# Patient Record
Sex: MALE | Race: BLACK OR AFRICAN AMERICAN
[De-identification: names, ages, dates, MRNs, and addresses within clinical notes are randomized per-mention and may not be internally consistent; named-entity substitution may affect disease eponyms.]

---

## 2019-03-22 ENCOUNTER — HOSPITAL ENCOUNTER (EMERGENCY)
Dept: HOSPITAL 97 - ER | Age: 39
Discharge: LEFT BEFORE DISCHARGE | End: 2019-03-22
Payer: SELF-PAY

## 2019-03-22 DIAGNOSIS — Z53.21: Primary | ICD-10-CM

## 2019-03-22 NOTE — ER
Nurse's Notes                                                                                     

 [facility]                                                                

Name: Isabel Gaitan                                                                               

Age: 38 yrs                                                                                       

Sex: Male                                                                                         

: 1980                                                                                   

MRN: [id number]                                                                                   

Arrival Date: 2019                                                                          

Time: 19:45                                                                                       

Account#: [id number]                                                                            

Bed Waiting                                                                                       

Private MD:                                                                                       

Diagnosis:                                                                                        

                                                                                                  

ED Course:                                                                                        

                                                                                             

19:45 Patient arrived in ED.                                                                  es  

20:08 Patient's name was called from ER lobby. No response.                                     

20:49 Patient's name was called from ER lobby. No response. Unable to locate patient. Will    bb  

      disposition as left without being seen by a provider.                                       

                                                                                                  

Administered Medications:                                                                         

No medications were administered                                                                  

                                                                                                  

                                                                                                  

Outcome:                                                                                          

20:50 Patient left the ED.                                                                    bb  

                                                                                                  

Signatures:                                                                                       

Christine Guevara, RN                  RN   Meche Greenwood Brenda RN                     RN   bb                                                   

                                                                                                  

**************************************************************************************************

## 2020-08-17 ENCOUNTER — HOSPITAL ENCOUNTER (EMERGENCY)
Dept: HOSPITAL 97 - ER | Age: 40
Discharge: HOME | End: 2020-08-17
Payer: SELF-PAY

## 2020-08-17 VITALS — SYSTOLIC BLOOD PRESSURE: 134 MMHG | TEMPERATURE: 98.1 F | DIASTOLIC BLOOD PRESSURE: 96 MMHG | OXYGEN SATURATION: 98 %

## 2020-08-17 DIAGNOSIS — F41.9: ICD-10-CM

## 2020-08-17 DIAGNOSIS — R07.9: Primary | ICD-10-CM

## 2020-08-17 PROCEDURE — 99284 EMERGENCY DEPT VISIT MOD MDM: CPT

## 2020-08-17 PROCEDURE — 93005 ELECTROCARDIOGRAM TRACING: CPT

## 2020-08-17 NOTE — EDPHYS
Physician Documentation                                                                           

 [facility]                                                                 

Name: Isabel Gaitan                                                                               

Age: 39 yrs                                                                                       

Sex: Male                                                                                         

: 1980                                                                                   

MRN: [id number]                                                                                   

Arrival Date: 2020                                                                          

Time: 16:16                                                                                       

Account#: [id number]                                                                            

Bed 15                                                                                            

Private MD:                                                                                       

ED Physician Juan Pablo Leggett                                                                         

HPI:                                                                                              

                                                                                             

16:24 This 39 yrs old Black Male presents to ER via Unassigned with complaints of Chest Pain  jr8 

      > 32 y/o.                                                                                   

16:24 The patient or guardian reports chest pain that is located primarily in the substernal  jr8 

      area. The pain does not radiate. Associated signs and symptoms: The patient has no          

      apparent associated signs or symptoms. The chest pain is described as sharp. Duration:      

      The patient or guardian reports a single episode, that is now resolved. Modifying           

      factors: The symptoms are alleviated by rest, the symptoms are aggravated by                

      emotionally stressful situations. Severity of pain: At its worst the pain was mild in       

      the emergency department the pain has resolved. The patient has not experienced similar     

      symptoms in the past. The patient has not recently seen a physician. Patient brought in     

      by PD for medical clearance after he started to complain of chest pain. Patient stated      

      that he is now better and feels that it was anxiety related. No symptoms at this time       

      and wants to be discharged .                                                                

                                                                                                  

Historical:                                                                                       

- Allergies:                                                                                      

16:28 No Known Allergies;                                                                     tw2 

- Home Meds:                                                                                      

16:28 None [Active];                                                                          tw2 

- PMHx:                                                                                           

16:28 Anxiety; chest pain; clausterphobic; Depression; Hernia;                                tw2 

                                                                                                  

- Immunization history:: Adult Immunizations.                                                     

- Social history:: Smoking status: .                                                              

                                                                                                  

                                                                                                  

ROS:                                                                                              

16:24 Eyes: Negative for injury, pain, redness, and discharge, ENT: Negative for injury,      jr8 

      pain, and discharge, Neck: Negative for injury, pain, and swelling, Respiratory:            

      Negative for shortness of breath, cough, wheezing, and pleuritic chest pain,                

      Abdomen/GI: Negative for abdominal pain, nausea, vomiting, diarrhea, and constipation,      

      Back: Negative for injury and pain, MS/Extremity: Negative for injury and deformity,        

      Skin: Negative for injury, rash, and discoloration, Neuro: Negative for headache,           

      weakness, numbness, tingling, and seizure.                                                  

16:24 Cardiovascular: Positive for chest pain, Negative for orthopnea, palpitations,              

      paroxysmal nocturnal dyspnea.                                                               

                                                                                                  

Exam:                                                                                             

16:24 Eyes:  Pupils equal round and reactive to light, extra-ocular motions intact.  Lids and jr8 

      lashes normal.  Conjunctiva and sclera are non-icteric and not injected.  Cornea within     

      normal limits.  Periorbital areas with no swelling, redness, or edema. ENT:  Nares          

      patent. No nasal discharge, no septal abnormalities noted.  Tympanic membranes are          

      normal and external auditory canals are clear.  Oropharynx with no redness, swelling,       

      or masses, exudates, or evidence of obstruction, uvula midline.  Mucous membranes           

      moist. Neck:  Trachea midline, no thyromegaly or masses palpated, and no cervical           

      lymphadenopathy.  Supple, full range of motion without nuchal rigidity, or vertebral        

      point tenderness.  No Meningismus. Cardiovascular:  Regular rate and rhythm with a          

      normal S1 and S2.  No gallops, murmurs, or rubs.  Normal PMI, no JVD.  No pulse             

      deficits. Respiratory:  Lungs have equal breath sounds bilaterally, clear to                

      auscultation and percussion.  No rales, rhonchi or wheezes noted.  No increased work of     

      breathing, no retractions or nasal flaring. Abdomen/GI:  Soft, non-tender, with normal      

      bowel sounds.  No distension or tympany.  No guarding or rebound.  No evidence of           

      tenderness throughout. Back:  No spinal tenderness.  No costovertebral tenderness.          

      Full range of motion. Skin:  Warm, dry with normal turgor.  Normal color with no            

      rashes, no lesions, and no evidence of cellulitis. MS/ Extremity:  Pulses equal, no         

      cyanosis.  Neurovascular intact.  Full, normal range of motion. Neuro:  Awake and           

      alert, GCS 15, oriented to person, place, time, and situation.  Cranial nerves II-XII       

      grossly intact.  Motor strength 5/5 in all extremities.  Sensory grossly intact.            

      Cerebellar exam normal.  Normal gait.                                                       

                                                                                                  

Vital Signs:                                                                                      

16:24  / 96; Pulse 71; Resp 17; Temp 98.1; Pulse Ox 98% on R/A; Pain 0/10;              tw2 

                                                                                                  

MDM:                                                                                              

16:22 Patient medically screened.                                                             jr8 

16:26 SHAY Risk Score: TOTAL SCORE = 1. Data reviewed: vital signs, nurses notes, EKG, and as jr8 

      a result, I will discharge patient. Data interpreted: Pulse oximetry: on room air is        

      100 %. Interpretation: normal. Counseling: I had a detailed discussion with the patient     

      and/or guardian regarding: the historical points, exam findings, and any diagnostic         

      results supporting the discharge/admit diagnosis, the need for outpatient follow up, a      

      family practitioner, to return to the emergency department if symptoms worsen or            

      persist or if there are any questions or concerns that arise at home.                       

                                                                                                  

                                                                                             

16:26 Order name: EKG; Complete Time: 16:26                                                   jr8 

                                                                                             

16:26 Order name: EKG - Nurse/Tech; Complete Time: 16:38                                      jr8 

                                                                                                  

Administered Medications:                                                                         

No medications were administered                                                                  

                                                                                                  

                                                                                                  

Disposition:                                                                                      

17:32 Co-signature as Attending Physician, Juan Pablo Leggett MD.                                    rn  

                                                                                                  

Disposition:                                                                                      

20 16:26 Discharged to Home. Impression: Chest pain, unspecified.                           

- Condition is Stable.                                                                            

- Discharge Instructions: Nonspecific Chest Pain.                                                 

                                                                                                  

- Medication Reconciliation Form, Thank You Letter, Antibiotic Education, Prescription            

  Opioid Use form.                                                                                

- Follow up: Private Physician; When: As needed; Reason: Recheck today's complaints,              

  Continuance of care, Re-evaluation by your physician.                                           

- Problem is new.                                                                                 

- Symptoms are resolved.                                                                          

                                                                                                  

                                                                                                  

                                                                                                  

Signatures:                                                                                       

Juan Pablo Leggett MD MD rn Roszak, Josh, PA PA   jr8                                                  

Shanell Stinson RN                          RN   tw2                                                  

                                                                                                  

Corrections: (The following items were deleted from the chart)                                    

16:39 16:26 2020 16:26 Discharged to Home. Impression: Chest pain, unspecified.         tw2 

      Condition is Stable. Forms are Medication Reconciliation Form, Thank You Letter,            

      Antibiotic Education, Prescription Opioid Use. Follow up: Private Physician; When: As       

      needed; Reason: Recheck today's complaints, Continuance of care, Re-evaluation by your      

      physician. Problem is new. Symptoms are resolved. jr8                                       

                                                                                                  

**************************************************************************************************

## 2020-08-17 NOTE — ER
Nurse's Notes                                                                                     

 [facility]                                                                 

Name: Isabel Gaitan                                                                               

Age: 39 yrs                                                                                       

Sex: Male                                                                                         

: 1980                                                                                   

MRN: [id number]                                                                                   

Arrival Date: 2020                                                                          

Time: 16:16                                                                                       

Account#: [id number]                                                                            

Bed 15                                                                                            

Private MD:                                                                                       

Diagnosis: Chest pain, unspecified                                                                

                                                                                                  

Presentation:                                                                                     

                                                                                             

16:24 Chief complaint: [place] [employer], he was going to [place] [employer] started having anxiety c/o  tw2 

      chest pain, states he is better now and is going to [facility]. Coronavirus screen: Client      

      denies travel out of the [place] in the last 14 days. At this time, the client does not        

      indicate any symptoms associated with coronavirus-19. Ebola Screen: Patient denies          

      travel to an Ebola-affected area in the 21 days before illness onset. Initial Sepsis        

      Screen: Does the patient meet any 2 criteria? No. Patient's initial sepsis screen is        

      negative. Does the patient have a suspected source of infection? No. Patient's initial      

      sepsis screen is negative. Risk Assessment: Do you want to hurt yourself or someone         

      else? Patient reports no desire to harm self or others. Onset of symptoms was 2020.                                                                                   

16:24 Method Of Arrival: Law Enforcement: [facility]                                         tw2 

16:24 Acuity: LATONYA 3                                                                           tw2 

16:29 Note provider JAMES Manjarrez PA at bedside at this time.                                      tw2 

                                                                                                  

Triage Assessment:                                                                                

16:26 General: Appears in no apparent distress. Behavior is calm, cooperative, appropriate    tw2 

      for age. Pain: Denies pain. EENT: No signs and/or symptoms were reported regarding the      

      EENT system. Neuro: Level of Consciousness is awake, alert, obeys commands, Oriented to     

      person, place, time, situation. Cardiovascular: Denies chest pain, shortness of breath,     

      Heart tones S1 S2 Patient's skin is warm and dry. Respiratory: Airway is patent             

      Respiratory effort is even, unlabored, Respiratory pattern is regular, symmetrical,         

      Breath sounds are clear bilaterally. GI: No signs and/or symptoms were reported             

      involving the gastrointestinal system. Abdomen is flat, Bowel sounds present X 4 quads.     

      : No signs and/or symptoms were reported regarding the genitourinary system. Derm: No     

      signs and/or symptoms reported regarding the dermatologic system. Musculoskeletal:          

      Range of motion: intact in all extremities.                                                 

                                                                                                  

Historical:                                                                                       

- Allergies:                                                                                      

16:28 No Known Allergies;                                                                     tw2 

- Home Meds:                                                                                      

16:28 None [Active];                                                                          tw2 

- PMHx:                                                                                           

16:28 Anxiety; chest pain; clausterphobic; Depression; Hernia;                                tw2 

                                                                                                  

- Immunization history:: Adult Immunizations.                                                     

- Social history:: Smoking status: .                                                              

                                                                                                  

                                                                                                  

Screenin: Abuse screen: Denies threats or abuse. Nutritional screening: No deficits noted.        tw2 

      Tuberculosis screening: No symptoms or risk factors identified. Fall Risk None              

      identified.                                                                                 

                                                                                                  

Assessment:                                                                                       

16:16 Reassessment: pt states "i feel no pain now i am just ready to go".                     tw2 

16:24 Pain: Pain does not radiate. Pain began suddenly.                                       tw2 

16:26 Reassessment: see triage assessment.                                                    tw2 

16: Reassessment:.                                                                          tw2 

16:39 Reassessment: Patient appears in no apparent distress at this time. No changes from     tw2 

      previously documented assessment. Patient and/or family updated on plan of care and         

      expected duration. Pain level reassessed. Patient is alert, oriented x 3, equal             

      unlabored respirations, skin warm/dry/pink.                                                 

                                                                                                  

Vital Signs:                                                                                      

16:24  / 96; Pulse 71; Resp 17; Temp 98.1; Pulse Ox 98% on R/A; Pain 0/10;              tw2 

                                                                                                  

ED Course:                                                                                        

16:16 Patient arrived in ED.                                                                  tw2 

16:22 Loki Norman PA is PHCP.                                                               [id number] 

16:22 Juan Pablo Leggett MD is Attending Physician.                                                [id number] 

16:24 Shanell Stinson RN is Primary Nurse.                                                        tw2 

16:24 Arm band placed on.                                                                     tw2 

16:26 Triage completed.                                                                       tw2 

16:28 Bed in low position. Call light in reach. Cardiac monitor on. Pulse ox on. NIBP on.     tw2 

16:39 No provider procedures requiring assistance completed. Patient did not have IV access   tw2 

      during this emergency room visit. Patient maintains SpO2 saturation greater than 95% on     

      room air.                                                                                   

                                                                                                  

Administered Medications:                                                                         

No medications were administered                                                                  

                                                                                                  

                                                                                                  

Outcome:                                                                                          

16: Discharge ordered by MD.                                                                [id number] 

16:39 Discharged to Law Enforcement                                                           tw2 

16:39 Condition: stable                                                                           

16:39 Discharge instructions given to patient, police, Instructed on discharge instructions,      

      follow up and referral plans. Demonstrated understanding of instructions, follow-up         

      care.                                                                                       

16:39 Patient left the ED.                                                                    tw2 

                                                                                                  

Signatures:                                                                                       

Loki Norman PA PA   jr8                                                  

Shanell Stinson RN                          RN   tw2                                                  

                                                                                                  

Corrections: (The following items were deleted from the chart)                                    

19:14 16:28 Reassessment: see triage assessment tw2                                           tw2 

                                                                                                  

**************************************************************************************************

## 2020-08-17 NOTE — XMS REPORT
Continuity of Care Document

                           Created on:2020



Patient:MADALYN MIGUEL

Sex:Male

:1980

External Reference #:[id number]





Demographics







                          Address                   [address] [place] [address]

 

                          Home Phone                [phone]

 

                          Work Phone                [phone]

 

                          Email Address             NONE

 

                          Preferred Language        English

 

                          Marital Status            Unknown

 

                          Christianity Affiliation     Unknown

 

                          Race                      Unknown

 

                          Additional Race(s)        Unavailable

 

                          Ethnic Group              Unknown









Author







                          Organization              [facility]

t

 

                          Address                   [address]



                                                    [place] [address]

 

                          Phone                     [phone]









Support







                Name            Relationship    Address         Phone

 

                JASMINE          Unavailable     [address] [phone]



                                                [place] [address] 

 

                JASMINE          Unavailable     [address] [facility] [employer] [phone]



                                                [place] [address] 









Care Team Providers







                    Name                Role                Phone

 

                    Unavailable         Unavailable         Unavailable









Payers







           Payer Name Policy Type Policy Number Effective Date Expiration Date S

ource







Problems

This patient has no known problems.



Allergies, Adverse Reactions, Alerts







       Allergy Allergy Status Severity Reaction(s) Onset  Inactive Treating Comm

ents 

Source



       Name   Type                        Date   Date   Clinician        

 

       No Known DA     Active U                                   [facility]



       Allergie                             6-10                        [facility]



       s                                  00:00:                      d



                                          00                          Medical



                                                                      Center







Medications

This patient has no known medications.



Procedures

This patient has no known procedures.



Encounters







        Start   End     Encounter Admission Attending Care    Care    Encounter 

Source



        Date/Time Date/Time Type    Type    Clinicians Facility Department ID   

   

 

        [id number] Outpatient                 [facility]    [id number]

[address] [place] [address]



        00:00:00 00:00:00                                                 [facility]







Results







           Test Description Test Time  Test Comments Results    Result Comments 

Source









                    CBC W/AUTO DIFF     2019-06-10 22:36:00 









                      Test Item  Value      Reference Range Interpretation Comme

nts









             WHITE BLOOD CELL (test code = 4.0 K/mm3    3.5-11.0     N          

  



             WBC)                                                

 

             RED BLOOD CELL (test code = RBC) 4.53 M/mm3   4.70-6.10    L       

     

 

             HEMOGLOBIN (test code = HGB) 10.9 G/DL    12.3-15.9    L           

 

 

             HEMATOCRIT (test code = HCT) 33.7 %       35.8-46.7    L           

 

 

             MEAN CELL VOLUME (test code = 74.4 Fl      86.3-98.9    L          

  



             MCV)                                                

 

             MEAN CELL HGB (test code = MCH) 24.1 pg      28.9-34.4    L        

    

 

             MEAN CELL HGB CONCETRATION (test 32.3 G/DL    32.1-34.5    N       

     



             code = MCHC)                                        

 

             RED CELL DISTRIBUTION WIDTH 15.8 SD      11.5-14.5    H            



             (test code = RDW)                                        

 

             PLATELET COUNT (test code = PLT) 126.0 K/mm3  150-450      L       

     

 

             MEAN PLATELET VOLUME (test code 10.70 fL     7.0-9.6      H        

    



             = MPV)                                              

 

             NEUTROPHIL % (test code = NT%) 33.6 %       40-76        L         

   

 

             LYMPHOCYTE % (test code = LY%) 42.1 %       20.5-51.1    N         

   

 

             MONOCYTE % (test code = MO%) 13.4 %       1.7-9.3      H           

 

 

             EOSINOPHIL % (test code = EO%) 10.6 %       0.0-6.0      H         

   

 

             BASOPHIL % (test code = BA%) 0.3 %        0.0-2.0      N           

 

 

             NEUTROPHIL # (test code = NT#) 1.34 K/mm3   1.8-7.6      L         

   

 

             LYMPHOCYTE # (test code = LY#) 1.7 K/mm3    0.6-3.0      N         

   

 

             MONOCYTE # (test code = MO#) 0.5 K/mm3    0.2-1.5      N           

 

 

             EOSINOPHIL # (test code = EO#) 0.4 K/mm3    0.0-0.4      N         

   

 

             BASOPHIL # (test code = BA#) 0.0 K/mm3    0.0-0.2      N           

 

 

             MANUAL DIFF REQUIRED (test code NO DIFF/SCN  CRITERIA              

    SLIDE REVIEW CONSISTANT 

WITH



             = MDIFF)                                            AUTO DIFFERENTI

AL.



COMPREHENSIVE METABOLIC PANEL2019-06-10 22:22:00





             Test Item    Value        Reference Range Interpretation Comments

 

             SODIUM (test code = NA) 139 mmol/L   134-147      N            

 

             POTASSIUM (test code = 3.4 mmol/L   3.4-5.0      N            



             K)                                                  

 

             CHLORIDE (test code = 106 mmol/L   100-108      N            



             CL)                                                 

 

             CARBON DIOXIDE (test 27 mmol/L    21-32        N            



             code = CO2)                                         

 

             ANION GAP (test code = 6.0 GAP calc 4.0-15.0     N            



             GAP)                                                

 

             GLUCOSE (test code = 89 MG/DL            N            



             GLU)                                                

 

             BLOOD UREA NITROGEN 16 MG/DL     7-18         N            



             (test code = BUN)                                        

 

             GLOMERULAR FILTRATION >=60 max estimate >60                       



             RATE (test code = GFR) estGFR                                 

 

             CREATININE (test code = 1.0 MG/DL    0.8-1.3      N            



             CREAT)                                              

 

             TOTAL PROTEIN (test code 8.3 G/DL     6.4-8.2      H            



             = PROT)                                             

 

             ALBUMIN (test code = 3.3 G/DL     3.4-5.0      L            



             ALB)                                                

 

             GLOBULIN (test code = 5.0 GM/dL                              



             GLOB)                                               

 

             ALBUMIN/GLOBULIN RATIO 0.7 RATIO    1.2-2.2      L            



             (test code = A/G)                                        

 

             CALCIUM (test code = CA) 8.5 MG/DL    8.5-10.1     N            

 

             BILIRUBIN TOTAL (test 0.30 MG/DL   0.2-1.2      N            



             code = BILT)                                        

 

             SGOT/AST (test code = 27 Unit/L    15-37        N            



             AST)                                                

 

             SGPT/ALT (test code = 21 Unit/L    12-78        N            



             ALT)                                                

 

             ALKALINE PHOSPHATASE 56 Unit/L           N            



             TOTAL (test code = ALKP)                                        



Completed by Nursing: NOLIPASE2019-06-10 22:22:00





             Test Item    Value        Reference Range Interpretation Comments

 

             LIPASE (test code = LIP) 117 Unit/L   114-286      N            



Completed by Nursing: [id number] 22:22:00





             Test Item    Value        Reference Range Interpretation Comments

 

             TROPONIN-I (test < 0.015 NG/ML 0.000-0.045  N            Negative: 

</= 0.045



             code = TROPI)                                        Positive: >/= 

0.046



                                                                 Correlation wit

h serial



                                                                 results, other 

cardiac



                                                                 markers, and cl

inical



                                                                 findings is nec

essary to



                                                                 determine the c

linical



                                                                 significance of

 this



                                                                 result. Quantit

ative



                                                                 results using d

ifferent



                                                                 methodologies s

hould not



                                                                 be compared to 

one



                                                                 another as nume

rical



                                                                 results may chris

yby



                                                                 method.



Completed by Nursing: NO- XR CHEST 1 -06-10 22:20:00 Name: MADALYN MIGUEL                      : 1980 Age/S: 38  
/ M         [id number]              Unit #: [id number]     Loc:         
     [place] [address]                Phys: Edinson Heaton MD                 
                                Acct: [id number]  Dis Date:               
Status: REG ER                                  PHONE #: [phone]     Exam 
Date: 06/10/2019  2206                     FAX #:                  Reason: chest
pain, left sided                             EXAMS:                             
                 CPT:         [id number] XR CHEST 1 V                            
  [id number]             Fluoro Time:   DAP (Gy m2):          Air Kerma (mGy):       
      EXAM:  - XR CHEST 1 V               HISTORY: Chest pain.               
COMPARISON: None available time of interpretation.               FINDINGS:      
      Single AP view of the chest is provided.       Heart size and vascularity 
are within normal limits.        The lungs are clear of focal consolidation. No 
effusion,        pneumothorax, or acute osseous abnormality.                    
    IMPRESSION:         No radiographic evidence of acute cardiopulmonary 
process.                  ** Electronically Signed by ALICE Obando ** 
     **              on 06/10/2019 at 2220              **                      
Reported and signed by: Catalino Obando M.D.                           CC: 
Edinson Heaton MD                                                              
                                PAGE1                       Signed Report       
                         Name: MADALYN MIGUELland             
        : 1980 Age/S: 38  / M         [id number]              
Unit #: [id number]     Loc:               [place] [address]                
Phys: Edinson Heaton MD                                                  Acct: 
[id number]  Dis Date:       Status: REG ER                                  
PHONE #: [phone]     Exam Date: 06/10/2019  2200                     FAX #:
                 Reason: chest pain, left sided          EXAMS:                 
                             CPT:           [id number] XR CHEST 1 V              
               [id number]             Fluoro Time:            DAP (Gy m2):          
Air Kerma (mGy):         &lt;Continued&gt; Technologist: Nirmal Maddox 
RT(R)(CT); ...    Trnscb Date/Time: 06/10/2019 () [id number]                
      Orig Print D/T: S: 06/10/2019 (0)                                      
      PAGE  2                       Signed ReportCB W/AUTO DIFF2019-06-10 
22:06:00





             Test Item    Value        Reference Range Interpretation Comments

 

             WHITE BLOOD CELL (test code = 4.0 K/mm3    3.5-11.0     N          

  



             WBC)                                                

 

             RED BLOOD CELL (test code = RBC) 4.53 M/mm3   4.70-6.10    L       

     

 

             HEMOGLOBIN (test code = HGB) 10.9 G/DL    12.3-15.9    L           

 

 

             HEMATOCRIT (test code = HCT) 33.7 %       35.8-46.7    L           

 

 

             MEAN CELL VOLUME (test code = 74.4 Fl      86.3-98.9    L          

  



             MCV)                                                

 

             MEAN CELL HGB (test code = MCH) 24.1 pg      28.9-34.4    L        

    

 

             MEAN CELL HGB CONCETRATION (test 32.3 G/DL    32.1-34.5    N       

     



             code = MCHC)                                        

 

             RED CELL DISTRIBUTION WIDTH (test 15.8 SD      11.5-14.5    H      

      



             code = RDW)                                         

 

             PLATELET COUNT (test code = PLT) 126.0 K/mm3  150-450      L       

     

 

             MEAN PLATELET VOLUME (test code = 10.70 fL     7.0-9.6      H      

      



             MPV)                                                

 

             NEUTROPHIL % (test code = NT%)  %           40-76        L         

   

 

             LYMPHOCYTE % (test code = LY%)  %           20.5-51.1    N         

   

 

             MONOCYTE % (test code = MO%)  %           1.7-9.3      H           

 

 

             EOSINOPHIL % (test code = EO%)  %           0.0-6.0      H         

   

 

             BASOPHIL % (test code = BA%)  %           0.0-2.0      N           

 

 

             NEUTROPHIL # (test code = NT#)  K/mm3       1.8-7.6      L         

   

 

             LYMPHOCYTE # (test code = LY#)  K/mm3       0.6-3.0      N         

   

 

             MONOCYTE # (test code = MO#)  K/mm3       0.2-1.5      N           

 

 

             EOSINOPHIL # (test code = EO#)  K/mm3       0.0-0.4      N         

   

 

             BASOPHIL # (test code = BA#)  K/mm3       0.0-0.2      N           

 

 

             MANUAL DIFF REQUIRED (test code =  DIFF/SCN    CRITERIA            

      



             MDIFF)

## 2020-08-19 NOTE — EKG
Test Date:    2020-08-17               Test Time:    16:34:04

Technician:   MT                                     

                                                     

MEASUREMENT RESULTS:                                       

Intervals:                                           

Rate:         80                                     

IA:           100                                    

QRSD:         102                                    

QT:           372                                    

QTc:          429                                    

Axis:                                                

P:            50                                     

IA:           100                                    

QRS:          64                                     

T:            61                                     

                                                     

INTERPRETIVE STATEMENTS:                                       

                                                     

Sinus rhythm with short IA

Otherwise normal ECG

Compared to ECG 09/08/2017 01:15:21

Short IA interval now present

Right-axis deviation no longer present

Incomplete right bundle-branch block no longer present



Electronically Signed On 08-19-20 07:40:07 CDT by Gonsalo Jacobson

## 2020-09-14 ENCOUNTER — HOSPITAL ENCOUNTER (EMERGENCY)
Dept: HOSPITAL 97 - ER | Age: 40
Discharge: LEFT BEFORE DISCHARGE | End: 2020-09-14
Payer: SELF-PAY

## 2020-09-14 VITALS — SYSTOLIC BLOOD PRESSURE: 123 MMHG | TEMPERATURE: 98.2 F | DIASTOLIC BLOOD PRESSURE: 89 MMHG | OXYGEN SATURATION: 99 %

## 2020-09-14 DIAGNOSIS — Z21: ICD-10-CM

## 2020-09-14 DIAGNOSIS — F17.210: ICD-10-CM

## 2020-09-14 DIAGNOSIS — R07.9: Primary | ICD-10-CM

## 2020-09-14 LAB
BUN BLD-MCNC: 16 MG/DL (ref 7–18)
GLUCOSE SERPLBLD-MCNC: 81 MG/DL (ref 74–106)
HCT VFR BLD CALC: 35 % (ref 39.6–49)
LYMPHOCYTES # SPEC AUTO: 1.3 K/UL (ref 0.7–4.9)
PMV BLD: 9.4 FL (ref 7.6–11.3)
POTASSIUM SERPL-SCNC: 3.4 MMOL/L (ref 3.5–5.1)
RBC # BLD: 4.69 M/UL (ref 4.33–5.43)
TROPONIN (EMERG DEPT USE ONLY): < 0.02 NG/ML (ref 0–0.04)

## 2020-09-14 PROCEDURE — 84484 ASSAY OF TROPONIN QUANT: CPT

## 2020-09-14 PROCEDURE — 93005 ELECTROCARDIOGRAM TRACING: CPT

## 2020-09-14 PROCEDURE — 80048 BASIC METABOLIC PNL TOTAL CA: CPT

## 2020-09-14 PROCEDURE — 36415 COLL VENOUS BLD VENIPUNCTURE: CPT

## 2020-09-14 PROCEDURE — 99284 EMERGENCY DEPT VISIT MOD MDM: CPT

## 2020-09-14 PROCEDURE — 85025 COMPLETE CBC W/AUTO DIFF WBC: CPT

## 2020-09-14 NOTE — XMS REPORT
Continuity of Care Document

                          Created on:2020



Patient:MADALYN MIGUEL

Sex:Male

:1980

External Reference #:[id number]





Demographics







                          Address                   [address] [place] [address]

 

                          Home Phone                [phone]

 

                          Work Phone                [phone]

 

                          Email Address             DECLINED

 

                          Preferred Language        English

 

                          Marital Status            Unknown

 

                          Pentecostal Affiliation     Unknown

 

                          Race                      Unknown

 

                          Additional Race(s)        Unavailable

 

                          Ethnic Group              Unknown









Author







                          Organization              [facility]

t

 

                          Address                   [address]



                                                    [place] [address]

 

                          Phone                     [phone]









Support







                Name            Relationship    Address         Phone

 

                JASMINE          Unavailable     [address] [employer] [phone]



                                                [place] [address] 

 

                JASMINE          Unavailable     [address] [employer] [phone]



                                                [place] [address] 









Care Team Providers







                    Name                Role                Phone

 

                    Unavailable         Unavailable         Unavailable









Payers







           Payer Name Policy Type Policy Number Effective Date Expiration Date S

ource







Problems

This patient has no known problems.



Allergies, Adverse Reactions, Alerts







       Allergy Allergy Status Severity Reaction(s) Onset  Inactive Treating Comm

ents 

Source



       Name   Type                        Date   Date   Clinician        

 

       No Known DA     Active U                                   [facility]



       Allergie                             6-10                        [facility]



       s                                  00:00:                      d



                                          00                          [facility]



                                                                      Center







Medications

This patient has no known medications.



Procedures

This patient has no known procedures.



Encounters







        Start   End     Encounter Admission Attending Care    Care    Encounter 

Source



        Date/Time Date/Time Type    Type    Clinicians Facility Department ID   

   

 

        [id number] [facility]                 [facility]    [id number]

[address] [place] [address]



        00:00:00 00:00:00                                                 [facility]







Results







           Test Description Test Time  Test Comments Results    Result Comments 

Source









                    CBC W/AUTO DIFF     2019-06-10 22:36:00 









                      Test Item  Value      Reference Range Interpretation Comme

nts









             WHITE BLOOD CELL (test code = 4.0 K/mm3    3.5-11.0     N          

  



             WBC)                                                

 

             RED BLOOD CELL (test code = RBC) 4.53 M/mm3   4.70-6.10    L       

     

 

             HEMOGLOBIN (test code = HGB) 10.9 G/DL    12.3-15.9    L           

 

 

             HEMATOCRIT (test code = HCT) 33.7 %       35.8-46.7    L           

 

 

             MEAN CELL VOLUME (test code = 74.4 Fl      86.3-98.9    L          

  



             MCV)                                                

 

             MEAN CELL HGB (test code = MCH) 24.1 pg      28.9-34.4    L        

    

 

             MEAN CELL HGB CONCETRATION (test 32.3 G/DL    32.1-34.5    N       

     



             code = MCHC)                                        

 

             RED CELL DISTRIBUTION WIDTH 15.8 SD      11.5-14.5    H            



             (test code = RDW)                                        

 

             PLATELET COUNT (test code = PLT) 126.0 K/mm3  150-450      L       

     

 

             MEAN PLATELET VOLUME (test code 10.70 fL     7.0-9.6      H        

    



             = MPV)                                              

 

             NEUTROPHIL % (test code = NT%) 33.6 %       40-76        L         

   

 

             LYMPHOCYTE % (test code = LY%) 42.1 %       20.5-51.1    N         

   

 

             MONOCYTE % (test code = MO%) 13.4 %       1.7-9.3      H           

 

 

             EOSINOPHIL % (test code = EO%) 10.6 %       0.0-6.0      H         

   

 

             BASOPHIL % (test code = BA%) 0.3 %        0.0-2.0      N           

 

 

             NEUTROPHIL # (test code = NT#) 1.34 K/mm3   1.8-7.6      L         

   

 

             LYMPHOCYTE # (test code = LY#) 1.7 K/mm3    0.6-3.0      N         

   

 

             MONOCYTE # (test code = MO#) 0.5 K/mm3    0.2-1.5      N           

 

 

             EOSINOPHIL # (test code = EO#) 0.4 K/mm3    0.0-0.4      N         

   

 

             BASOPHIL # (test code = BA#) 0.0 K/mm3    0.0-0.2      N           

 

 

             MANUAL DIFF REQUIRED (test code NO DIFF/SCN  CRITERIA              

    SLIDE REVIEW CONSISTANT 

WITH



             = MDIFF)                                            AUTO DIFFERENTI

AL.



COMPREHENSIVE METABOLIC PANEL2019-06-10 22:22:00





             Test Item    Value        Reference Range Interpretation Comments

 

             SODIUM (test code = NA) 139 mmol/L   134-147      N            

 

             POTASSIUM (test code = 3.4 mmol/L   3.4-5.0      N            



             K)                                                  

 

             CHLORIDE (test code = 106 mmol/L   100-108      N            



             CL)                                                 

 

             CARBON DIOXIDE (test 27 mmol/L    21-32        N            



             code = CO2)                                         

 

             ANION GAP (test code = 6.0 GAP calc 4.0-15.0     N            



             GAP)                                                

 

             GLUCOSE (test code = 89 MG/DL            N            



             GLU)                                                

 

             BLOOD UREA NITROGEN 16 MG/DL     7-18         N            



             (test code = BUN)                                        

 

             GLOMERULAR FILTRATION >=60 max estimate >60                       



             RATE (test code = GFR) estGFR                                 

 

             CREATININE (test code = 1.0 MG/DL    0.8-1.3      N            



             CREAT)                                              

 

             TOTAL PROTEIN (test code 8.3 G/DL     6.4-8.2      H            



             = PROT)                                             

 

             ALBUMIN (test code = 3.3 G/DL     3.4-5.0      L            



             ALB)                                                

 

             GLOBULIN (test code = 5.0 GM/dL                              



             GLOB)                                               

 

             ALBUMIN/GLOBULIN RATIO 0.7 RATIO    1.2-2.2      L            



             (test code = A/G)                                        

 

             CALCIUM (test code = CA) 8.5 MG/DL    8.5-10.1     N            

 

             BILIRUBIN TOTAL (test 0.30 MG/DL   0.2-1.2      N            



             code = BILT)                                        

 

             SGOT/AST (test code = 27 Unit/L    15-37        N            



             AST)                                                

 

             SGPT/ALT (test code = 21 Unit/L    12-78        N            



             ALT)                                                

 

             ALKALINE PHOSPHATASE 56 Unit/L           N            



             TOTAL (test code = ALKP)                                        



Completed by Nursing: NOLIPASE2019-06-10 22:22:00





             Test Item    Value        Reference Range Interpretation Comments

 

             LIPASE (test code = LIP) 117 Unit/L   114-286      N            



Completed by Nursing: AIPHFBHNKH--65-10 22:22:00





             Test Item    Value        Reference Range Interpretation Comments

 

             TROPONIN-I (test < 0.015 NG/ML 0.000-0.045  N            Negative: 

</= 0.045



             code = TROPI)                                        Positive: >/= 

0.046



                                                                 Correlation wit

h serial



                                                                 results, other 

cardiac



                                                                 markers, and cl

inical



                                                                 findings is nec

essary to



                                                                 determine the c

linical



                                                                 significance of

 this



                                                                 result. Quantit

ative



                                                                 results using d

ifferent



                                                                 methodologies s

hould not



                                                                 be compared to 

one



                                                                 another as nume

rical



                                                                 results may chris

yby



                                                                 method.



Completed by Nursing: NO- XR CHEST 1 -06-10 22:20:00 Name: MADALYN MIGUEL                      : 1980 Age/S: 38  
/ M         [id number]              Unit #: [id number]     Loc:         
     [place] [address]                Phys: Edinson Heaton MD                 
                                Acct: [id number]  Dis Date:               
Status: REG ER                                  PHONE #: [phone]     Exam 
Date: 06/10/2019  2206                     FAX #:                  Reason: chest
pain, left sided                             EXAMS:                             
                 CPT:         [id number] XR CHEST 1 V                            
  [address]             Fluoro Time:   DAP (Gy m2):          Air Kerma (mGy):       
      EXAM:  - XR CHEST 1 V               HISTORY: Chest pain.               
COMPARISON: None available time of interpretation.               FINDINGS:      
      Single AP view of the chest is provided.       Heart size and vascularity 
are within normal limits.        The lungs are clear of focal consolidation. No 
effusion,        pneumothorax, or acute osseous abnormality.                    
    IMPRESSION:         No radiographic evidence of acute cardiopulmonary 
process.                  ** Electronically Signed by ALICE Obando ** 
     **              on 06/10/2019 at 2220              **                      
Reported and signed by: Catalino Obando M.D.                           CC: 
Edinson Heaton MD                                                              
                                PAGE1                       Signed Report       
                         Name: MADALYN MIGUELland             
        : 1980 Age/S: 38  / M         [id number]              
Unit #: [id number]     Loc:               [place] [address]                
Phys: Edinson Heaton MD                                                  Acct: 
[id number]  Dis Date:       Status: REG ER                                  
PHONE #: [phone]     Exam Date: 06/10/2019  2200                     FAX #:
                 Reason: chest pain, left sided          EXAMS:                 
                             CPT:           [id number] XR CHEST 1 V              
               [id number]             Fluoro Time:            DAP (Gy m2):          
Air Kerma (mGy):         &lt;Continued&gt; Technologist: Nirmal Maddox 
RT(R)(CT); ...    Trnscb Date/Time: 06/10/2019 () [id number]                
      Orig Print D/T: S: 06/10/2019 (2)                                      
      PAGE  2                       Signed ReportCB W/AUTO DIFF2019-06-10 
22:06:00





             Test Item    Value        Reference Range Interpretation Comments

 

             WHITE BLOOD CELL (test code = 4.0 K/mm3    3.5-11.0     N          

  



             WBC)                                                

 

             RED BLOOD CELL (test code = RBC) 4.53 M/mm3   4.70-6.10    L       

     

 

             HEMOGLOBIN (test code = HGB) 10.9 G/DL    12.3-15.9    L           

 

 

             HEMATOCRIT (test code = HCT) 33.7 %       35.8-46.7    L           

 

 

             MEAN CELL VOLUME (test code = 74.4 Fl      86.3-98.9    L          

  



             MCV)                                                

 

             MEAN CELL HGB (test code = MCH) 24.1 pg      28.9-34.4    L        

    

 

             MEAN CELL HGB CONCETRATION (test 32.3 G/DL    32.1-34.5    N       

     



             code = MCHC)                                        

 

             RED CELL DISTRIBUTION WIDTH (test 15.8 SD      11.5-14.5    H      

      



             code = RDW)                                         

 

             PLATELET COUNT (test code = PLT) 126.0 K/mm3  150-450      L       

     

 

             MEAN PLATELET VOLUME (test code = 10.70 fL     7.0-9.6      H      

      



             MPV)                                                

 

             NEUTROPHIL % (test code = NT%)  %           40-76        L         

   

 

             LYMPHOCYTE % (test code = LY%)  %           20.5-51.1    N         

   

 

             MONOCYTE % (test code = MO%)  %           1.7-9.3      H           

 

 

             EOSINOPHIL % (test code = EO%)  %           0.0-6.0      H         

   

 

             BASOPHIL % (test code = BA%)  %           0.0-2.0      N           

 

 

             NEUTROPHIL # (test code = NT#)  K/mm3       1.8-7.6      L         

   

 

             LYMPHOCYTE # (test code = LY#)  K/mm3       0.6-3.0      N         

   

 

             MONOCYTE # (test code = MO#)  K/mm3       0.2-1.5      N           

 

 

             EOSINOPHIL # (test code = EO#)  K/mm3       0.0-0.4      N         

   

 

             BASOPHIL # (test code = BA#)  K/mm3       0.0-0.2      N           

 

 

             MANUAL DIFF REQUIRED (test code =  DIFF/SCN    CRITERIA            

      



             MDIFF)

## 2020-09-14 NOTE — EDPHYS
Physician Documentation                                                                           

 [facility]                                                                 

Name: Isabel Gaitan                                                                               

Age: 39 yrs                                                                                       

Sex: Male                                                                                         

: 1980                                                                                   

MRN: [id number]                                                                                   

Arrival Date: 2020                                                                          

Time: 03:44                                                                                       

Account#: [id number]                                                                            

Bed 14                                                                                            

Private MD:                                                                                       

ED Physician Juan Pablo Leggett                                                                         

HPI:                                                                                              

                                                                                             

03:59 This 39 yrs old Black Male presents to ER via EMS with complaints of chest pain.        rn  

03:59 The patient or guardian reports chest pain that is located primarily in the substernal  rn  

      area, anterior chest wall. The pain does not radiate. The chest pain is described as        

      sharp, stabbing. Duration: The patient or guardian reports multiple episodes. Modifying     

      factors: The symptoms are alleviated by nothing. the symptoms are aggravated by             

      breathing. Severity of pain: At its worst the pain was mild in the emergency department     

      the pain has improved. The patient has experienced similar episodes in the past.            

      Reports intermittent sharp stabbing chest pain for months, no trauma, + smoker, no          

      fever, does not feel sick, no famhx of cardiac problems at his age. No abd pain. No         

      vomiting/diaphoresis/sob. .                                                                 

                                                                                                  

Historical:                                                                                       

- Allergies:                                                                                      

03:53 No Known Allergies;                                                                     rv  

- PMHx:                                                                                           

03:53 Anxiety; chest pain; clausterphobic; Depression; Hernia; HIV;                           rv  

- PSHx:                                                                                           

03:53 None;                                                                                   rv  

                                                                                                  

- Immunization history:: Adult Immunizations up to date.                                          

- Social history:: Smoking status: Patient denies any tobacco usage or history of.                

  Patient uses street drugs, marijuana.                                                           

- Family history:: not pertinent.                                                                 

- Hospitalizations: : No recent hospitalization is reported.                                      

                                                                                                  

                                                                                                  

ROS:                                                                                              

03:59 Constitutional: Negative for fever, chills, and weight loss, Eyes: Negative for injury, rn  

      pain, redness, and discharge, Neck: Negative for injury, pain, and swelling,                

      Cardiovascular: Negative for palpitations, and edema, Respiratory: Negative for             

      shortness of breath, cough, wheezing Abdomen/GI: Negative for abdominal pain, nausea,       

      vomiting, diarrhea, and constipation, MS/Extremity: Negative for injury and deformity,      

      Skin: Negative for injury, rash, and discoloration, Neuro: Negative for headache,           

      weakness, numbness, tingling, and seizure.                                                  

                                                                                                  

Exam:                                                                                             

03:59 Constitutional:  This is a well developed, well nourished patient who is awake, alert,  rn  

      and in no acute distress. Head/Face:  Normocephalic, atraumatic. Cardiovascular:            

      Regular rate and rhythm.  No pulse deficits. Respiratory:  Speaking full sentences.  No     

      increased work of breathing, no retractions or nasal flaring. Abdomen/GI:  soft,            

      non-tender Skin:  Warm, dry MS/ Extremity:  Pulses equal, no cyanosis.  Neurovascular       

      intact.  Full, normal range of motion.  Equal circumference. Neuro:  Awake and alert,       

      GCS 15                                                                                      

04:01 ECG was reviewed by the Attending Physician.                                            rn  

                                                                                                  

Vital Signs:                                                                                      

03:51 Weight 86.18 kg; Height 6 ft. 2 in. (187.96 cm); Pain 2/10;                             rv  

04:00  / 89; Pulse 76; Resp 18; Temp 98.2; Pulse Ox 99% on R/A;                         wh  

03:51 Body Mass Index 24.39 (86.18 kg, 187.96 cm)                                             rv  

                                                                                                  

MDM:                                                                                              

03:48 Patient medically screened.                                                             rn  

04:12 Differential diagnosis: acute myocardial infarction, acute pericarditis, coronary       rn  

      artery disease chest wall pain, costochondritis, pleurisy, pneumonia, pneumothorax. ED      

      course: Pt now refusing tests and cxr, reports feels fine, and wants to go home.            

      Requesting to leave. Explained risk of leaving prior to results, he understands, pain       

      most likely pleurisy from smoking habits, but recommend testing given HIV status.           

      Patient asks to leave..                                                                     

04:19 Data reviewed: vital signs, nurses notes, EKG.                                          rn  

                                                                                                  

                                                                                             

03:58 Order name: CBC with Diff                                                               rn  

                                                                                             

03:58 Order name: Basic Metabolic Panel                                                       rn  

                                                                                             

03:58 Order name: IV Start; Complete Time: 04:04                                              rn  

                                                                                             

03:58 Order name: Troponin (emerg Dept Use Only)                                              rn  

                                                                                             

03:58 Order name: EKG; Complete Time: 03:59                                                   rn  

                                                                                             

03:58 Order name: EKG - Nurse/Tech; Complete Time: 04:04                                      rn  

                                                                                                  

EC:01 Rate is 71 beats/min. Rhythm is regular. QRS Axis is Normal. ND interval is normal. QRS rn  

      interval is normal. QT interval is normal. No Q waves. T waves are Normal. No ST            

      changes noted. Clinical impression: Normal ECG. Interpreted by me. Reviewed by me.          

                                                                                                  

Administered Medications:                                                                         

No medications were administered                                                                  

                                                                                                  

                                                                                                  

Disposition:                                                                                      

20 04:23 Patient has left against medical advice. Impression: Chest pain, unspecified. -    

  Patients states they are going to Home.                                                         

- Condition is Stable.                                                                            

- Discharge Instructions: Nonspecific Chest Pain, Pleurisy, Steps to Quit Smoking.                

                                                                                                  

                                                                                                  

Follow up: Private Physician; When: As needed; Reason: Recheck today's complaints,                

  Re-evaluation by your physician.                                                                

- Problem is an ongoing problem.                                                                  

- Symptoms are unchanged.                                                                         

                                                                                                  

                                                                                                  

                                                                                                  

Signatures:                                                                                       

Dispatcher [employer]                           EDMS                                                 

Juan Pablo Leggett MD MD rn Habalo, Winsy wh Vicente, Ronaldo, RN                    RN   rv                                                   

                                                                                                  

Corrections: (The following items were deleted from the chart)                                    

04:26 04:23 2020 04:23 Patients has left against medical advice. Impression: Chest      wh  

      pain, unspecified. Patient states they are going to Home. Condition is Stable. Follow       

      up: Private Physician; When: As needed; Reason: Recheck today's complaints,                 

      Re-evaluation by your physician. Problem is an ongoing problem. Symptoms are unchanged.     

      rn                                                                                          

                                                                                                  

**************************************************************************************************

## 2020-09-14 NOTE — ER
Nurse's Notes                                                                                     

 [facility]                                                                 

Name: Isabel Gaitan                                                                               

Age: 39 yrs                                                                                       

Sex: Male                                                                                         

: 1980                                                                                   

MRN: [id number]                                                                                   

Arrival Date: 2020                                                                          

Time: 03:44                                                                                       

Account#: [id number]                                                                            

Bed 14                                                                                            

Private MD:                                                                                       

Diagnosis: Chest pain, unspecified                                                                

                                                                                                  

Presentation:                                                                                     

                                                                                             

03:51 Chief complaint: EMS states: SHARP PAIN ON AND OFF FOR A WHILE NOW. MIDSTERNAL. GIVEN   rv  

      324MG PO ON THE WAY. CHEST PAIN IS RELIEVED. Coronavirus screen: Client denies travel       

      out of the [place] in the last 14 days. At this time, the client does not indicate any         

      symptoms associated with coronavirus-19. Ebola Screen: No symptoms or risks identified      

      at this time. Initial Sepsis Screen: Does the patient meet any 2 criteria? No.              

      Patient's initial sepsis screen is negative. Does the patient have a suspected source       

      of infection? No. Patient's initial sepsis screen is negative. Risk Assessment: Do you      

      want to hurt yourself or someone else? Patient reports no desire to harm self or            

      others. Onset of symptoms is unknown.                                                       

03:51 Method Of Arrival: EMS: [facility]                                                       rv  

03:51 Acuity: LATONYA 3                                                                           rv  

                                                                                                  

Historical:                                                                                       

- Allergies:                                                                                      

03:53 No Known Allergies;                                                                     rv  

- PMHx:                                                                                           

03:53 Anxiety; chest pain; clausterphobic; Depression; Hernia; HIV;                           rv  

- PSHx:                                                                                           

03:53 None;                                                                                   rv  

                                                                                                  

- Immunization history:: Adult Immunizations up to date.                                          

- Social history:: Smoking status: Patient denies any tobacco usage or history of.                

  Patient uses street drugs, marijuana.                                                           

- Family history:: not pertinent.                                                                 

- Hospitalizations: : No recent hospitalization is reported.                                      

                                                                                                  

                                                                                                  

Assessment:                                                                                       

04:15 Reassessment: Pt asking for her mother to be allowed in the room, explained [facility] [place]  

      policy regarding not allowing visitors. Pt adamant and requesting mother to be allowed      

      and refusing treatment and diagnostics, notified Charge Nurse.                              

04:20 Reassessment: Charge Nurse speaking with Pt.                                              

                                                                                                  

Vital Signs:                                                                                      

03:51 Weight 86.18 kg; Height 6 ft. 2 in. (187.96 cm); Pain 2/10;                             rv  

04:00  / 89; Pulse 76; Resp 18; Temp 98.2; Pulse Ox 99% on R/A;                         wh  

03:51 Body Mass Index 24.39 (86.18 kg, 187.96 cm)                                             rv  

                                                                                                  

ED Course:                                                                                        

03:44 Patient arrived in ED.                                                                    

03:44 Jong Mosley is Primary Nurse.                                                           

03:48 Juan Pablo Leggett MD is Attending Physician.                                                rn  

03:52 Triage completed.                                                                       rv  

03:53 Arm band placed on right wrist. Patient placed in the treatment room, on a stretcher,   rv  

      Patient notified of wait time.                                                              

03:55 EKG done, by ED staff, reviewed by Juan Pablo Leggett MD.                                        

04:00 Inserted saline lock: 20 gauge in right antecubital area, using aseptic technique.        

      Blood collected.                                                                            

04:20 IV discontinued, intact, bleeding controlled, No redness/swelling at site.                

                                                                                                  

Administered Medications:                                                                         

No medications were administered                                                                  

                                                                                                  

                                                                                                  

Outcome:                                                                                          

04:23 AMA Other Pt just signed name on form but didn't leave signature                          

04:23 Condition: stable                                                                           

04:23 Instructed on POC                                                                           

04:26 Patient left the ED.                                                                      

                                                                                                  

Signatures:                                                                                       

Juan Pablo Leggett MD MD rn Habalo, Winsy                                                                                   

Óscar Dempsey, RN                    RN   rv                                                   

                                                                                                  

**************************************************************************************************